# Patient Record
Sex: FEMALE | Race: WHITE | NOT HISPANIC OR LATINO | URBAN - METROPOLITAN AREA
[De-identification: names, ages, dates, MRNs, and addresses within clinical notes are randomized per-mention and may not be internally consistent; named-entity substitution may affect disease eponyms.]

---

## 2023-03-30 DIAGNOSIS — E78.5 HYPERLIPIDEMIA, UNSPECIFIED: ICD-10-CM

## 2023-03-30 RX ORDER — ATORVASTATIN CALCIUM 10 MG/1
TABLET, FILM COATED ORAL
Qty: 90 TABLET | Refills: 1 | Status: SHIPPED | OUTPATIENT
Start: 2023-03-30 | End: 2023-05-04

## 2023-04-11 ENCOUNTER — OFFICE VISIT (OUTPATIENT)
Dept: PRIMARY CARE | Facility: CLINIC | Age: 78
End: 2023-04-11
Payer: COMMERCIAL

## 2023-04-11 VITALS
OXYGEN SATURATION: 97 % | HEART RATE: 68 BPM | SYSTOLIC BLOOD PRESSURE: 140 MMHG | HEIGHT: 62 IN | DIASTOLIC BLOOD PRESSURE: 70 MMHG | BODY MASS INDEX: 26.85 KG/M2 | WEIGHT: 145.9 LBS

## 2023-04-11 DIAGNOSIS — E89.0 POSTABLATIVE HYPOTHYROIDISM: ICD-10-CM

## 2023-04-11 DIAGNOSIS — E55.9 VITAMIN D DEFICIENCY: ICD-10-CM

## 2023-04-11 DIAGNOSIS — E78.5 HYPERLIPIDEMIA, UNSPECIFIED HYPERLIPIDEMIA TYPE: ICD-10-CM

## 2023-04-11 DIAGNOSIS — E21.0 PRIMARY HYPERPARATHYROIDISM (MULTI): ICD-10-CM

## 2023-04-11 DIAGNOSIS — R03.0 ELEVATED BP WITHOUT DIAGNOSIS OF HYPERTENSION: Primary | ICD-10-CM

## 2023-04-11 PROBLEM — K21.9 ESOPHAGEAL REFLUX: Status: ACTIVE | Noted: 2023-04-11

## 2023-04-11 PROBLEM — H53.2 DIPLOPIA: Status: ACTIVE | Noted: 2023-04-11

## 2023-04-11 PROBLEM — D72.9 ABNORMAL WBC COUNT: Status: ACTIVE | Noted: 2023-04-11

## 2023-04-11 PROBLEM — J32.0 CHRONIC SINUSITIS OF BOTH MAXILLARY SINUSES: Status: ACTIVE | Noted: 2023-04-11

## 2023-04-11 PROBLEM — T14.8XXA MUSCLE STRAIN: Status: ACTIVE | Noted: 2023-04-11

## 2023-04-11 PROBLEM — M85.80 OSTEOPENIA: Status: ACTIVE | Noted: 2023-04-11

## 2023-04-11 PROBLEM — S83.90XA KNEE SPRAIN: Status: ACTIVE | Noted: 2023-04-11

## 2023-04-11 PROBLEM — M79.643 HAND PAIN: Status: ACTIVE | Noted: 2023-04-11

## 2023-04-11 PROBLEM — H61.23 IMPACTED CERUMEN OF BOTH EARS: Status: ACTIVE | Noted: 2023-04-11

## 2023-04-11 PROBLEM — H57.02 PHYSIOLOGIC ANISOCORIA: Status: ACTIVE | Noted: 2023-04-11

## 2023-04-11 PROBLEM — R09.82 POST-NASAL DRIP: Status: ACTIVE | Noted: 2023-04-11

## 2023-04-11 PROBLEM — E66.3 OVERWEIGHT WITH BODY MASS INDEX (BMI) OF 27 TO 27.9 IN ADULT: Status: RESOLVED | Noted: 2023-04-11 | Resolved: 2023-04-11

## 2023-04-11 PROBLEM — J30.9 ALLERGIC RHINITIS: Status: ACTIVE | Noted: 2023-04-11

## 2023-04-11 PROBLEM — K64.8 INTERNAL HEMORRHOIDS: Status: ACTIVE | Noted: 2023-04-11

## 2023-04-11 PROBLEM — M25.569 KNEE PAIN: Status: ACTIVE | Noted: 2023-04-11

## 2023-04-11 PROBLEM — E66.3 OVERWEIGHT WITH BODY MASS INDEX (BMI) OF 27 TO 27.9 IN ADULT: Status: ACTIVE | Noted: 2023-04-11

## 2023-04-11 PROBLEM — E07.9 THYROID EYE DISEASE: Status: ACTIVE | Noted: 2023-04-11

## 2023-04-11 PROBLEM — R79.9 ABNORMAL BLOOD CHEMISTRY: Status: ACTIVE | Noted: 2023-04-11

## 2023-04-11 PROBLEM — K59.09 CHRONIC CONSTIPATION: Status: ACTIVE | Noted: 2023-04-11

## 2023-04-11 PROBLEM — H61.23 IMPACTED CERUMEN OF BOTH EARS: Status: RESOLVED | Noted: 2023-04-11 | Resolved: 2023-04-11

## 2023-04-11 PROBLEM — R05.9 COUGH: Status: ACTIVE | Noted: 2023-04-11

## 2023-04-11 PROBLEM — H10.45 CHRONIC ALLERGIC CONJUNCTIVITIS: Status: ACTIVE | Noted: 2023-04-11

## 2023-04-11 PROBLEM — R92.1 BREAST CALCIFICATIONS: Status: ACTIVE | Noted: 2023-04-11

## 2023-04-11 PROBLEM — R73.01 IMPAIRED FASTING GLUCOSE: Status: RESOLVED | Noted: 2023-04-11 | Resolved: 2023-04-11

## 2023-04-11 PROBLEM — H50.10 CONSECUTIVE EXOTROPIA: Status: ACTIVE | Noted: 2023-04-11

## 2023-04-11 PROBLEM — M20.40 HAMMER TOE: Status: ACTIVE | Noted: 2023-04-11

## 2023-04-11 PROBLEM — K20.90 ESOPHAGITIS: Status: ACTIVE | Noted: 2023-04-11

## 2023-04-11 PROBLEM — H57.89 THYROID EYE DISEASE: Status: ACTIVE | Noted: 2023-04-11

## 2023-04-11 PROBLEM — H25.12 AGE-RELATED NUCLEAR CATARACT OF LEFT EYE: Status: ACTIVE | Noted: 2023-04-11

## 2023-04-11 PROBLEM — E34.9 ENDOCRINE EXOPHTHALMOS: Status: ACTIVE | Noted: 2023-04-11

## 2023-04-11 PROBLEM — R73.01 IMPAIRED FASTING GLUCOSE: Status: ACTIVE | Noted: 2023-04-11

## 2023-04-11 PROBLEM — E83.52 HYPERCALCEMIA: Status: ACTIVE | Noted: 2023-04-11

## 2023-04-11 PROBLEM — M20.10 HALLUX VALGUS: Status: ACTIVE | Noted: 2023-04-11

## 2023-04-11 PROBLEM — J32.0 CHRONIC SINUSITIS OF BOTH MAXILLARY SINUSES: Status: RESOLVED | Noted: 2023-04-11 | Resolved: 2023-04-11

## 2023-04-11 PROBLEM — H05.20 ENDOCRINE EXOPHTHALMOS: Status: ACTIVE | Noted: 2023-04-11

## 2023-04-11 PROBLEM — E03.9 HYPOTHYROIDISM: Status: ACTIVE | Noted: 2023-04-11

## 2023-04-11 PROBLEM — J32.4 CHRONIC PANSINUSITIS: Status: ACTIVE | Noted: 2023-04-11

## 2023-04-11 PROBLEM — E05.00 GRAVES DISEASE: Status: ACTIVE | Noted: 2023-04-11

## 2023-04-11 PROBLEM — H47.091: Status: ACTIVE | Noted: 2023-04-11

## 2023-04-11 PROCEDURE — 99213 OFFICE O/P EST LOW 20 MIN: CPT | Performed by: PHYSICIAN ASSISTANT

## 2023-04-11 PROCEDURE — 1159F MED LIST DOCD IN RCRD: CPT | Performed by: PHYSICIAN ASSISTANT

## 2023-04-11 PROCEDURE — 1036F TOBACCO NON-USER: CPT | Performed by: PHYSICIAN ASSISTANT

## 2023-04-11 PROCEDURE — 1160F RVW MEDS BY RX/DR IN RCRD: CPT | Performed by: PHYSICIAN ASSISTANT

## 2023-04-11 RX ORDER — ASPIRIN 81 MG/1
81 TABLET ORAL DAILY
COMMUNITY

## 2023-04-11 RX ORDER — VIT C/E/ZN/COPPR/LUTEIN/ZEAXAN 250MG-90MG
1 CAPSULE ORAL EVERY OTHER DAY
COMMUNITY

## 2023-04-11 RX ORDER — CHOLECALCIFEROL (VITAMIN D3) 125 MCG
CAPSULE ORAL
COMMUNITY
Start: 2021-05-17

## 2023-04-11 RX ORDER — LEVOTHYROXINE SODIUM 100 UG/1
100 TABLET ORAL DAILY
COMMUNITY
End: 2023-12-29

## 2023-04-11 RX ORDER — FLUTICASONE PROPIONATE 50 MCG
2 SPRAY, SUSPENSION (ML) NASAL DAILY
COMMUNITY

## 2023-04-11 ASSESSMENT — PATIENT HEALTH QUESTIONNAIRE - PHQ9
1. LITTLE INTEREST OR PLEASURE IN DOING THINGS: NOT AT ALL
SUM OF ALL RESPONSES TO PHQ9 QUESTIONS 1 AND 2: 0
2. FEELING DOWN, DEPRESSED OR HOPELESS: NOT AT ALL

## 2023-04-11 NOTE — PROGRESS NOTES
"Subjective   Caroline Bond is a 77 y.o. female who presents for Follow-up.  HPI 77-year-old female presenting for follow-up.  Overall doing very well.  No complaints.    HLD: Compliant with atorvastatin 10 mg, 81 mg ASA.  Lives a fairly active lifestyle.    Hypothyroidism, Grave's disease s/p ablation (2011): Compliant with Synthroid 100 mcg daily.  Denies any hot/cold intolerance, abnormal weight gain/loss, palpitations, constipation.  She follows with endocrinology, last seen 11/14/2022.  Follows with ophthalmologist, Dr. Wynn.    Primary hyperparathyroidism: Asymptomatic.  On vitamin D 5000 units daily.  Follows with endocrinology.    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:  per GYN, \"BIRADS 3 mammogram in November, recommended 6 month f/u (some likely benign calcifications seen in left breast)\"    Health maintenance:  Immunizations:  -Flu: deferred to fall 2023  -Pneumococcal: UTD  -Shingrix: UTD  -Tdap: Due, obtain from local pharmacy  Mammogram UTD (last 11/7/2022, needed 6-month follow-up which will be due in May)-Per GYN  Colon CA screening-not indicated due to age  DEXA UTD (last 1/4/2022)     Last MCR: 10/10/2022 (plain Medicare)    /70   Pulse 68   Ht 1.575 m (5' 2\")   Wt 66.2 kg (145 lb 14.4 oz)   SpO2 97%   BMI 26.69 kg/m²   Objective   Physical Exam  Vitals reviewed.   Constitutional:       General: She is not in acute distress.     Appearance: Normal appearance. She is not ill-appearing.   HENT:      Head: Normocephalic and atraumatic.   Eyes:      General: No scleral icterus.     Extraocular Movements: Extraocular movements intact.      Conjunctiva/sclera: Conjunctivae normal.      Pupils: Pupils are equal, round, and reactive to light.   Cardiovascular:      Rate and Rhythm: Normal rate and regular rhythm.      Heart sounds: Normal heart sounds. No murmur heard.     No friction rub. No gallop.   Pulmonary:      Effort: Pulmonary effort is normal. No respiratory " distress.      Breath sounds: Normal breath sounds. No stridor. No wheezing, rhonchi or rales.   Musculoskeletal:      Cervical back: Normal range of motion.      Right lower leg: No edema.      Left lower leg: No edema.   Skin:     General: Skin is warm and dry.   Neurological:      Mental Status: She is alert and oriented to person, place, and time. Mental status is at baseline.      Cranial Nerves: No cranial nerve deficit.      Gait: Gait normal.   Psychiatric:         Mood and Affect: Mood normal.         Behavior: Behavior normal.         Assessment/Plan   Problem List Items Addressed This Visit       Elevated BP without diagnosis of hypertension - Primary     BP slightly above goal but acceptable for age.  Continue managing through diet and exercise.  Follow strict DASH diet and exercise as tolerated.         Hyperlipidemia     Continue atorvastatin 10 mg and 81 mg ASA.  Lipid panel ordered.  Follow Mediterranean-style diet and regular exercise.         Relevant Orders    Comprehensive metabolic panel    Lipid panel    Hypothyroidism     Continue Synthroid 100 mcg daily.  TFTs ordered.  Follow with endocrinology.         Relevant Orders    Tsh With Reflex To Free T4 If Abnormal    Primary hyperparathyroidism (CMS/HCC)     Asymptomatic.  Continue vitamin D 5000 units daily with food.  Follow with endocrinology.          Other Visit Diagnoses       Vitamin D deficiency        Relevant Orders    Vitamin D 25-Hydroxy,Total

## 2023-04-11 NOTE — ASSESSMENT & PLAN NOTE
BP slightly above goal but acceptable for age.  Continue managing through diet and exercise.  Follow strict DASH diet and exercise as tolerated.

## 2023-04-11 NOTE — ASSESSMENT & PLAN NOTE
Continue atorvastatin 10 mg and 81 mg ASA.  Lipid panel ordered.  Follow Mediterranean-style diet and regular exercise.

## 2023-04-11 NOTE — ASSESSMENT & PLAN NOTE
S/p ablation 2011.  Continue Synthroid 100 mcg daily.  TFTs ordered.  Follow with endocrinology and ophthalmology.

## 2023-05-03 DIAGNOSIS — E78.5 HYPERLIPIDEMIA, UNSPECIFIED: ICD-10-CM

## 2023-05-04 RX ORDER — ATORVASTATIN CALCIUM 10 MG/1
TABLET, FILM COATED ORAL
Qty: 90 TABLET | Refills: 1 | Status: SHIPPED | OUTPATIENT
Start: 2023-05-04 | End: 2023-10-05

## 2023-07-18 ENCOUNTER — LAB (OUTPATIENT)
Dept: LAB | Facility: LAB | Age: 78
End: 2023-07-18
Payer: COMMERCIAL

## 2023-07-18 DIAGNOSIS — E78.5 HYPERLIPIDEMIA, UNSPECIFIED HYPERLIPIDEMIA TYPE: ICD-10-CM

## 2023-07-18 DIAGNOSIS — E89.0 POSTABLATIVE HYPOTHYROIDISM: ICD-10-CM

## 2023-07-18 DIAGNOSIS — E55.9 VITAMIN D DEFICIENCY: ICD-10-CM

## 2023-07-18 LAB
ALANINE AMINOTRANSFERASE (SGPT) (U/L) IN SER/PLAS: 17 U/L (ref 7–45)
ALBUMIN (G/DL) IN SER/PLAS: 4.3 G/DL (ref 3.4–5)
ALKALINE PHOSPHATASE (U/L) IN SER/PLAS: 74 U/L (ref 33–136)
ANION GAP IN SER/PLAS: 11 MMOL/L (ref 10–20)
ASPARTATE AMINOTRANSFERASE (SGOT) (U/L) IN SER/PLAS: 17 U/L (ref 9–39)
BILIRUBIN TOTAL (MG/DL) IN SER/PLAS: 0.5 MG/DL (ref 0–1.2)
CALCIDIOL (25 OH VITAMIN D3) (NG/ML) IN SER/PLAS: 62 NG/ML
CALCIUM (MG/DL) IN SER/PLAS: 10.7 MG/DL (ref 8.6–10.6)
CARBON DIOXIDE, TOTAL (MMOL/L) IN SER/PLAS: 29 MMOL/L (ref 21–32)
CHLORIDE (MMOL/L) IN SER/PLAS: 105 MMOL/L (ref 98–107)
CHOLESTEROL (MG/DL) IN SER/PLAS: 158 MG/DL (ref 0–199)
CHOLESTEROL IN HDL (MG/DL) IN SER/PLAS: 62.3 MG/DL
CHOLESTEROL/HDL RATIO: 2.5
CREATININE (MG/DL) IN SER/PLAS: 0.7 MG/DL (ref 0.5–1.05)
GFR FEMALE: 88 ML/MIN/1.73M2
GLUCOSE (MG/DL) IN SER/PLAS: 93 MG/DL (ref 74–99)
LDL: 82 MG/DL (ref 0–99)
POTASSIUM (MMOL/L) IN SER/PLAS: 4.5 MMOL/L (ref 3.5–5.3)
PROTEIN TOTAL: 6.7 G/DL (ref 6.4–8.2)
SODIUM (MMOL/L) IN SER/PLAS: 140 MMOL/L (ref 136–145)
THYROTROPIN (MIU/L) IN SER/PLAS BY DETECTION LIMIT <= 0.05 MIU/L: 1.23 MIU/L (ref 0.44–3.98)
TRIGLYCERIDE (MG/DL) IN SER/PLAS: 69 MG/DL (ref 0–149)
UREA NITROGEN (MG/DL) IN SER/PLAS: 12 MG/DL (ref 6–23)
VLDL: 14 MG/DL (ref 0–40)

## 2023-07-18 PROCEDURE — 84443 ASSAY THYROID STIM HORMONE: CPT

## 2023-07-18 PROCEDURE — 36415 COLL VENOUS BLD VENIPUNCTURE: CPT

## 2023-07-18 PROCEDURE — 80061 LIPID PANEL: CPT

## 2023-07-18 PROCEDURE — 82306 VITAMIN D 25 HYDROXY: CPT

## 2023-07-18 PROCEDURE — 80053 COMPREHEN METABOLIC PANEL: CPT

## 2023-07-21 NOTE — RESULT ENCOUNTER NOTE
Lab results are back.  All look good except:    Calcium level was slightly elevated above normal.  Cut back on calcium rich foods in the diet, take deep breaths, and increase water intake.  We will continue to monitor

## 2023-10-02 ENCOUNTER — APPOINTMENT (OUTPATIENT)
Dept: PRIMARY CARE | Facility: CLINIC | Age: 78
End: 2023-10-02
Payer: COMMERCIAL

## 2023-10-04 DIAGNOSIS — E78.5 HYPERLIPIDEMIA, UNSPECIFIED: ICD-10-CM

## 2023-10-05 RX ORDER — ATORVASTATIN CALCIUM 10 MG/1
TABLET, FILM COATED ORAL
Qty: 100 TABLET | Refills: 0 | Status: SHIPPED | OUTPATIENT
Start: 2023-10-05 | End: 2023-12-14

## 2023-10-13 ENCOUNTER — OFFICE VISIT (OUTPATIENT)
Dept: PRIMARY CARE | Facility: CLINIC | Age: 78
End: 2023-10-13
Payer: COMMERCIAL

## 2023-10-13 VITALS
HEIGHT: 63 IN | WEIGHT: 141.5 LBS | OXYGEN SATURATION: 94 % | DIASTOLIC BLOOD PRESSURE: 68 MMHG | BODY MASS INDEX: 25.07 KG/M2 | HEART RATE: 66 BPM | SYSTOLIC BLOOD PRESSURE: 111 MMHG

## 2023-10-13 DIAGNOSIS — J00 COMMON COLD: Primary | ICD-10-CM

## 2023-10-13 DIAGNOSIS — Z23 ENCOUNTER FOR IMMUNIZATION: ICD-10-CM

## 2023-10-13 PROCEDURE — 1036F TOBACCO NON-USER: CPT | Performed by: PHYSICIAN ASSISTANT

## 2023-10-13 PROCEDURE — G0008 ADMIN INFLUENZA VIRUS VAC: HCPCS | Performed by: PHYSICIAN ASSISTANT

## 2023-10-13 PROCEDURE — 1159F MED LIST DOCD IN RCRD: CPT | Performed by: PHYSICIAN ASSISTANT

## 2023-10-13 PROCEDURE — 90662 IIV NO PRSV INCREASED AG IM: CPT | Performed by: PHYSICIAN ASSISTANT

## 2023-10-13 PROCEDURE — 99213 OFFICE O/P EST LOW 20 MIN: CPT | Performed by: PHYSICIAN ASSISTANT

## 2023-10-13 PROCEDURE — 1126F AMNT PAIN NOTED NONE PRSNT: CPT | Performed by: PHYSICIAN ASSISTANT

## 2023-10-13 PROCEDURE — 1160F RVW MEDS BY RX/DR IN RCRD: CPT | Performed by: PHYSICIAN ASSISTANT

## 2023-10-13 ASSESSMENT — COLUMBIA-SUICIDE SEVERITY RATING SCALE - C-SSRS
2. HAVE YOU ACTUALLY HAD ANY THOUGHTS OF KILLING YOURSELF?: NO
6. HAVE YOU EVER DONE ANYTHING, STARTED TO DO ANYTHING, OR PREPARED TO DO ANYTHING TO END YOUR LIFE?: NO

## 2023-10-13 NOTE — PROGRESS NOTES
"Subjective   Caroline Bond is a 78 y.o. female who presents for Follow-up (Pt does not want to do the wellness visit; would like to discuss cold symptoms/).  HPI Caroline Bond is a 78 y.o. female presenting with c/o cold symptoms for the past month. She states she woke up on 9/17/23 with a sore throat, which lasted for 4 days. Had pain talking, eating, swallowing. Then developed fatigue, cough, sputum production, fatigue, sinus drainage (mixed color, mostly clear), post nasal drip, sneezing, itchy watery eyes. Denies any fever, chills, sick contcts.   Has tried mucinex-DM and ibuprofen. COVID test was negative.     12 point ROS reviewed and negative other than as stated in HPI    /68   Pulse 66   Ht 1.588 m (5' 2.5\")   Wt 64.2 kg (141 lb 8 oz)   SpO2 94%   BMI 25.47 kg/m²   Objective   Physical Exam  Vitals reviewed.   Constitutional:       General: She is not in acute distress.     Appearance: Normal appearance.   HENT:      Head: Normocephalic and atraumatic.      Right Ear: Tympanic membrane, ear canal and external ear normal. There is no impacted cerumen.      Left Ear: Tympanic membrane, ear canal and external ear normal. There is no impacted cerumen.      Nose: Nose normal. No congestion or rhinorrhea.      Mouth/Throat:      Mouth: Mucous membranes are moist.      Pharynx: Oropharynx is clear. No oropharyngeal exudate or posterior oropharyngeal erythema.   Eyes:      General: No scleral icterus.        Right eye: No discharge.         Left eye: No discharge.      Extraocular Movements: Extraocular movements intact.      Conjunctiva/sclera: Conjunctivae normal.      Pupils: Pupils are equal, round, and reactive to light.   Cardiovascular:      Rate and Rhythm: Normal rate and regular rhythm.      Heart sounds: Normal heart sounds. No murmur heard.     No friction rub. No gallop.   Pulmonary:      Effort: Pulmonary effort is normal. No respiratory distress.      Breath sounds: Normal breath " sounds. No stridor. No wheezing, rhonchi or rales.   Neurological:      Mental Status: She is alert.         Assessment/Plan   Problem List Items Addressed This Visit       Common cold - Primary     Nearly resolved. Take OTC antihistamine for itchy watery eyes, sneezing, post nasal drip. Take flonase for any sinus pressure, ear popping, and post nasal drip. Take mucinex for any sputum production. Call the office if no improvement of symptoms despite outlined treatment         Encounter for immunization    Relevant Orders    Flu vaccine, quadrivalent, high-dose, preservative free, age 65y+ (FLUZONE) (Completed)        Follow up in a few months for MCR wellness or sooner as needed

## 2023-10-15 PROBLEM — J00 COMMON COLD: Status: ACTIVE | Noted: 2023-10-15

## 2023-10-15 PROBLEM — Z23 ENCOUNTER FOR IMMUNIZATION: Status: ACTIVE | Noted: 2023-10-15

## 2023-10-16 NOTE — ASSESSMENT & PLAN NOTE
Nearly resolved. Take OTC antihistamine for itchy watery eyes, sneezing, post nasal drip. Take flonase for any sinus pressure, ear popping, and post nasal drip. Take mucinex for any sputum production. Call the office if no improvement of symptoms despite outlined treatment

## 2023-11-08 ENCOUNTER — ANCILLARY PROCEDURE (OUTPATIENT)
Dept: RADIOLOGY | Facility: CLINIC | Age: 78
End: 2023-11-08
Payer: COMMERCIAL

## 2023-11-08 DIAGNOSIS — R92.1 MAMMOGRAPHIC CALCIFICATION FOUND ON DIAGNOSTIC IMAGING OF BREAST: ICD-10-CM

## 2023-11-08 PROCEDURE — 77066 DX MAMMO INCL CAD BI: CPT | Performed by: RADIOLOGY

## 2023-11-08 PROCEDURE — 77062 BREAST TOMOSYNTHESIS BI: CPT

## 2023-11-08 PROCEDURE — G0279 TOMOSYNTHESIS, MAMMO: HCPCS | Performed by: RADIOLOGY

## 2023-11-14 ENCOUNTER — OFFICE VISIT (OUTPATIENT)
Dept: ENDOCRINOLOGY | Facility: CLINIC | Age: 78
End: 2023-11-14
Payer: COMMERCIAL

## 2023-11-14 VITALS — WEIGHT: 141 LBS | SYSTOLIC BLOOD PRESSURE: 108 MMHG | DIASTOLIC BLOOD PRESSURE: 70 MMHG | BODY MASS INDEX: 25.38 KG/M2

## 2023-11-14 DIAGNOSIS — E05.00 GRAVES DISEASE: ICD-10-CM

## 2023-11-14 DIAGNOSIS — E89.0 POSTABLATIVE HYPOTHYROIDISM: Primary | ICD-10-CM

## 2023-11-14 DIAGNOSIS — H57.89 THYROID EYE DISEASE: ICD-10-CM

## 2023-11-14 DIAGNOSIS — E07.9 THYROID EYE DISEASE: ICD-10-CM

## 2023-11-14 DIAGNOSIS — E21.0 PRIMARY HYPERPARATHYROIDISM (MULTI): ICD-10-CM

## 2023-11-14 PROCEDURE — 1159F MED LIST DOCD IN RCRD: CPT | Performed by: INTERNAL MEDICINE

## 2023-11-14 PROCEDURE — 99214 OFFICE O/P EST MOD 30 MIN: CPT | Performed by: INTERNAL MEDICINE

## 2023-11-14 PROCEDURE — 1126F AMNT PAIN NOTED NONE PRSNT: CPT | Performed by: INTERNAL MEDICINE

## 2023-11-14 PROCEDURE — 1160F RVW MEDS BY RX/DR IN RCRD: CPT | Performed by: INTERNAL MEDICINE

## 2023-11-14 PROCEDURE — 1036F TOBACCO NON-USER: CPT | Performed by: INTERNAL MEDICINE

## 2023-11-14 NOTE — PROGRESS NOTES
Patient ID: Caroline Bond is a 78 y.o. female who presents for Follow-up.  HPI  The patient comes in for follow up.    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    Her eyes have remained stable although she does have some double vision looking to the left.    She continues on Synthroid 100 mcg/day.    She continues to have no symptoms of hyper or hypothyroidism or hypercalcemia.    Physically she has no complaints.    ROS  Comprehensive review of systems is negative.    Objective   Physical Exam  Visit Vitals  /70      Vitals:    11/14/23 1029   Weight: 64 kg (141 lb)      Body mass index is 25.38 kg/m².      Eyes mild proptosis right greater than left no injection  ENT normal. No adenopathy  Thyroid impalpable. No nodules  Chest clear to auscultation  Heart sounds are normal  Abdomen nontender. Bowel sounds normal. No organomegaly  Feet are okay    Assessment/Plan     1.  Graves' disease post ablation on replacement  2.  Ophthalmopathy  3.  Primary hyperparathyroidism  4.  Hyperlipidemia  5.  Impaired fasting glucose    We reviewed her blood work from July.    She will continue her current regimen.    Again encouraged her to watch her carbohydrate intake.    We discussed the eyes and if they become more of an issue following up with Dr. Wynn whom she has seen in the past.    She will follow-up with me in 1 year sooner as needed.

## 2023-12-14 ENCOUNTER — OFFICE VISIT (OUTPATIENT)
Dept: OBSTETRICS AND GYNECOLOGY | Facility: CLINIC | Age: 78
End: 2023-12-14
Payer: COMMERCIAL

## 2023-12-14 VITALS
DIASTOLIC BLOOD PRESSURE: 78 MMHG | BODY MASS INDEX: 25.34 KG/M2 | HEIGHT: 63 IN | WEIGHT: 143 LBS | SYSTOLIC BLOOD PRESSURE: 122 MMHG

## 2023-12-14 DIAGNOSIS — E78.5 HYPERLIPIDEMIA, UNSPECIFIED: ICD-10-CM

## 2023-12-14 DIAGNOSIS — Z12.39 ENCOUNTER FOR SCREENING BREAST EXAMINATION: Primary | ICD-10-CM

## 2023-12-14 PROBLEM — E03.9 HYPOTHYROIDISM: Status: ACTIVE | Noted: 2019-07-03

## 2023-12-14 PROBLEM — K21.9 GERD (GASTROESOPHAGEAL REFLUX DISEASE): Status: ACTIVE | Noted: 2019-07-03

## 2023-12-14 PROCEDURE — 1159F MED LIST DOCD IN RCRD: CPT | Performed by: OBSTETRICS & GYNECOLOGY

## 2023-12-14 PROCEDURE — 1160F RVW MEDS BY RX/DR IN RCRD: CPT | Performed by: OBSTETRICS & GYNECOLOGY

## 2023-12-14 PROCEDURE — 99213 OFFICE O/P EST LOW 20 MIN: CPT | Performed by: OBSTETRICS & GYNECOLOGY

## 2023-12-14 PROCEDURE — 1036F TOBACCO NON-USER: CPT | Performed by: OBSTETRICS & GYNECOLOGY

## 2023-12-14 PROCEDURE — 1126F AMNT PAIN NOTED NONE PRSNT: CPT | Performed by: OBSTETRICS & GYNECOLOGY

## 2023-12-14 RX ORDER — ATORVASTATIN CALCIUM 10 MG/1
TABLET, FILM COATED ORAL
Qty: 100 TABLET | Refills: 1 | Status: SHIPPED | OUTPATIENT
Start: 2023-12-14

## 2023-12-14 ASSESSMENT — ENCOUNTER SYMPTOMS
CONSTITUTIONAL NEGATIVE: 0
ALLERGIC/IMMUNOLOGIC NEGATIVE: 0
GASTROINTESTINAL NEGATIVE: 0
ENDOCRINE NEGATIVE: 0
MUSCULOSKELETAL NEGATIVE: 0
CARDIOVASCULAR NEGATIVE: 0
PSYCHIATRIC NEGATIVE: 0
RESPIRATORY NEGATIVE: 0
NEUROLOGICAL NEGATIVE: 0
HEMATOLOGIC/LYMPHATIC NEGATIVE: 0
EYES NEGATIVE: 0

## 2023-12-14 NOTE — PROGRESS NOTES
"Caroline Bond is a 78 y.o. here for breast exam    HPI: Pt is here her every 6 month breast exam.  Her next trip is a cruise to [facility] [place].    Objective   /78   Ht 1.588 m (5' 2.5\")   Wt 64.9 kg (143 lb)   LMP  (LMP Unknown)   BMI 25.74 kg/m²      General:   Alert and oriented, in no acute distress   Neck:    Breast/Axilla: Normal to palpation bilaterally without masses, skin changes, or nipple discharge.    Abdomen:    Vulva:    Vagina:    Cervix:    Uterus:    Adnexa:    Pelvic Floor    Psych Normal affect. Normal mood.      Assessment and Plan:  Breast exam normal  Problem List Items Addressed This Visit    None  Visit Diagnoses       Encounter for screening breast examination    -  Primary           No orders of the defined types were placed in this encounter.      "

## 2023-12-28 DIAGNOSIS — E05.00 GRAVES DISEASE: Primary | ICD-10-CM

## 2023-12-29 RX ORDER — LEVOTHYROXINE SODIUM 100 UG/1
100 TABLET ORAL DAILY
Qty: 100 TABLET | Refills: 2 | Status: SHIPPED | OUTPATIENT
Start: 2023-12-29

## 2024-02-09 ENCOUNTER — TELEPHONE (OUTPATIENT)
Dept: OBSTETRICS AND GYNECOLOGY | Facility: CLINIC | Age: 79
End: 2024-02-09
Payer: COMMERCIAL

## 2024-02-09 DIAGNOSIS — Z12.31 BREAST CANCER SCREENING BY MAMMOGRAM: ICD-10-CM

## 2024-02-09 NOTE — TELEPHONE ENCOUNTER
Called pt, no answer, left voicemail for return call  Re: order in for mamm nuber left to schedule [phone]

## 2024-04-05 ENCOUNTER — OFFICE VISIT (OUTPATIENT)
Dept: PRIMARY CARE | Facility: CLINIC | Age: 79
End: 2024-04-05
Payer: COMMERCIAL

## 2024-04-05 ENCOUNTER — TELEPHONE (OUTPATIENT)
Dept: PRIMARY CARE | Facility: CLINIC | Age: 79
End: 2024-04-05

## 2024-04-05 VITALS
HEIGHT: 62 IN | RESPIRATION RATE: 16 BRPM | OXYGEN SATURATION: 97 % | WEIGHT: 138 LBS | SYSTOLIC BLOOD PRESSURE: 145 MMHG | BODY MASS INDEX: 25.4 KG/M2 | TEMPERATURE: 98 F | DIASTOLIC BLOOD PRESSURE: 83 MMHG | HEART RATE: 68 BPM

## 2024-04-05 DIAGNOSIS — R79.9 ABNORMAL FINDING OF BLOOD CHEMISTRY, UNSPECIFIED: ICD-10-CM

## 2024-04-05 DIAGNOSIS — E55.9 VITAMIN D DEFICIENCY: ICD-10-CM

## 2024-04-05 DIAGNOSIS — E89.0 POSTABLATIVE HYPOTHYROIDISM: ICD-10-CM

## 2024-04-05 DIAGNOSIS — Z78.0 MENOPAUSE: ICD-10-CM

## 2024-04-05 DIAGNOSIS — Z00.00 HEALTH CARE MAINTENANCE: ICD-10-CM

## 2024-04-05 DIAGNOSIS — E78.5 HYPERLIPIDEMIA, UNSPECIFIED HYPERLIPIDEMIA TYPE: Primary | ICD-10-CM

## 2024-04-05 PROCEDURE — 1157F ADVNC CARE PLAN IN RCRD: CPT | Performed by: STUDENT IN AN ORGANIZED HEALTH CARE EDUCATION/TRAINING PROGRAM

## 2024-04-05 PROCEDURE — 99214 OFFICE O/P EST MOD 30 MIN: CPT | Performed by: STUDENT IN AN ORGANIZED HEALTH CARE EDUCATION/TRAINING PROGRAM

## 2024-04-05 NOTE — PROGRESS NOTES
Subjective   Patient ID: Caroline Bond is a 78 y.o. female who presents for Establish Care.  HPI    Caroline Bond is 78 y.o. is here to establish care  Medical history and medications are significant for     Hyperlipidemia on atorvastatin 10 mg   Hypothyroidism  - synthroid   Low dose adspurin   Reports is abnormal mammogram in the past however advised to get mammogram 6 months    NO concerns/ Main concerns today:             Past Medical History:   Diagnosis Date    Benign neoplasm of unspecified breast 08/31/2017    Breast fibroadenoma    Encounter for screening mammogram for malignant neoplasm of breast     Visit for screening mammogram    Hyperlipidemia, unspecified 07/11/2014    Hyperlipidemia    Hypothyroidism, unspecified 07/11/2014    Hypothyroidism    Other chronic allergic conjunctivitis 01/06/2016    Chronic allergic conjunctivitis    Other conditions influencing health status     DEXA Body Composition Study    Other disorders of optic nerve, not elsewhere classified, right eye 06/30/2014    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    Personal history of malignant neoplasm, unspecified     History of malignant neoplasm    Personal history of other diseases of the digestive system 12/05/2014    History of oral aphthous ulcers    Personal history of other diseases of the musculoskeletal system and connective tissue     History of osteopenia    Personal history of other endocrine, nutritional and metabolic disease     History of hyperparathyroidism    Personal history of other specified conditions     History of edema    Primary hyperparathyroidism (CMS/HCC) 07/11/2014    Primary hyperparathyroidism    Thyrotoxicosis with diffuse goiter without thyrotoxic crisis or storm     Graves disease    Unspecified benign mammary dysplasia of unspecified breast 12/19/2019    Atypical ductal hyperplasia of breast    Unspecified benign mammary dysplasia of unspecified breast     Atypical lobular hyperplasia of  "breast    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      Past Surgical History:   Procedure Laterality Date    BREAST BIOPSY Right 09/16/2013    Biopsy Breast Open    CHOLECYSTECTOMY  09/16/2013    Cholecystectomy Laparoscopic    COLONOSCOPY  06/26/2013    Complete Colonoscopy    HYSTERECTOMY  09/16/2013    Hysterectomy    OTHER SURGICAL HISTORY  09/29/2022    Sinus surgery    OTHER SURGICAL HISTORY  03/30/2018    Staging Laparotomy For Hodgkin's Disease Or Lymphoma    OTHER SURGICAL HISTORY  10/08/2019    Appendectomy    OTHER SURGICAL HISTORY  10/08/2019    Cataract surgery      Family History   Problem Relation Name Age of Onset    Heart failure Mother      Dementia Mother      Hypertension Mother      Macular degeneration Mother      Dementia Father      Parkinsonism Father      Basal cell carcinoma Sister      Cancer Sister      Thyroid disease Sister      Hyperlipidemia Sister        Allergies   Allergen Reactions    Codeine Unknown    Ephedrine Unknown     heart palpitations  throat swelling    Moxifloxacin Unknown     neuropathy    Penicillins Unknown          Occupation:     Review of Systems   Constitutional:  Negative for activity change and fever.   HENT:  Negative for congestion.    Respiratory:  Negative for cough, shortness of breath and wheezing.    Cardiovascular:  Negative for chest pain and leg swelling.   Gastrointestinal:  Negative for abdominal pain, constipation, nausea and vomiting.   Endocrine: Negative for cold intolerance.   Genitourinary:  Negative for dysuria, hematuria and urgency.   Neurological:  Negative for dizziness, speech difficulty, weakness and numbness.   Psychiatric/Behavioral:  Negative for self-injury and suicidal ideas.        Objective   Visit Vitals  /83   Pulse 68   Temp 36.7 °C (98 °F)   Resp 16   Ht 1.575 m (5' 2\")   Wt 62.6 kg (138 lb)   LMP  (LMP Unknown)   SpO2 97%   BMI 25.24 kg/m²   OB Status Postmenopausal   Smoking Status Never   BSA 1.65 m²    "   Physical Exam  Constitutional:       Appearance: Normal appearance.   HENT:      Head: Normocephalic and atraumatic.      Nose: Nose normal.      Mouth/Throat:      Mouth: Mucous membranes are moist.   Eyes:      Conjunctiva/sclera: Conjunctivae normal.      Pupils: Pupils are equal, round, and reactive to light.   Cardiovascular:      Rate and Rhythm: Normal rate and regular rhythm.      Pulses: Normal pulses.      Heart sounds: Normal heart sounds.   Pulmonary:      Effort: Pulmonary effort is normal.      Breath sounds: Normal breath sounds.   Musculoskeletal:         General: Normal range of motion.      Cervical back: Neck supple.   Skin:     General: Skin is warm.   Neurological:      General: No focal deficit present.      Mental Status: She is alert and oriented to person, place, and time.   Psychiatric:         Mood and Affect: Mood normal.         Behavior: Behavior normal.         Thought Content: Thought content normal.         Judgment: Judgment normal.         Assessment/Plan   Diagnoses and all orders for this visit:  Hyperlipidemia, unspecified hyperlipidemia type  Repeat lipid panel.  Lifestyle modifications.  Continue atorvastatin 10 mg  -     Lipid Panel; Future  Postablative hypothyroidism  Repeat TSH.  Continue Synthroid-     TSH; Future  Health care maintenance  -     CBC; Future  -     Comprehensive Metabolic Panel; Future  -     Vitamin D 25 hydroxy; Future  -     XR DEXA bone density; Future  Vitamin D deficiency  -     Vitamin D 25 hydroxy; Future  Abnormal finding of blood chemistry, unspecified  -     CBC; Future  Menopause  -     XR DEXA bone density; Future

## 2024-04-29 ASSESSMENT — ENCOUNTER SYMPTOMS
DYSURIA: 0
VOMITING: 0
NAUSEA: 0
WEAKNESS: 0
ABDOMINAL PAIN: 0
NUMBNESS: 0
SHORTNESS OF BREATH: 0
WHEEZING: 0
FEVER: 0
DIZZINESS: 0
HEMATURIA: 0
COUGH: 0
CONSTIPATION: 0
ACTIVITY CHANGE: 0
SPEECH DIFFICULTY: 0

## 2024-05-10 ENCOUNTER — HOSPITAL ENCOUNTER (OUTPATIENT)
Dept: RADIOLOGY | Facility: CLINIC | Age: 79
Discharge: HOME | End: 2024-05-10
Payer: COMMERCIAL

## 2024-05-10 DIAGNOSIS — Z12.31 BREAST CANCER SCREENING BY MAMMOGRAM: ICD-10-CM

## 2024-05-16 ENCOUNTER — APPOINTMENT (OUTPATIENT)
Dept: PRIMARY CARE | Facility: CLINIC | Age: 79
End: 2024-05-16
Payer: COMMERCIAL

## 2024-06-13 ENCOUNTER — APPOINTMENT (OUTPATIENT)
Dept: OBSTETRICS AND GYNECOLOGY | Facility: CLINIC | Age: 79
End: 2024-06-13
Payer: COMMERCIAL

## 2024-06-20 ENCOUNTER — APPOINTMENT (OUTPATIENT)
Dept: OBSTETRICS AND GYNECOLOGY | Facility: CLINIC | Age: 79
End: 2024-06-20
Payer: COMMERCIAL

## 2024-06-27 ENCOUNTER — APPOINTMENT (OUTPATIENT)
Dept: OBSTETRICS AND GYNECOLOGY | Facility: CLINIC | Age: 79
End: 2024-06-27
Payer: COMMERCIAL

## 2024-07-01 ENCOUNTER — LAB (OUTPATIENT)
Dept: LAB | Facility: LAB | Age: 79
End: 2024-07-01
Payer: COMMERCIAL

## 2024-07-01 DIAGNOSIS — E55.9 VITAMIN D DEFICIENCY: ICD-10-CM

## 2024-07-01 DIAGNOSIS — R79.9 ABNORMAL FINDING OF BLOOD CHEMISTRY, UNSPECIFIED: ICD-10-CM

## 2024-07-01 DIAGNOSIS — Z00.00 HEALTH CARE MAINTENANCE: ICD-10-CM

## 2024-07-01 DIAGNOSIS — E89.0 POSTABLATIVE HYPOTHYROIDISM: ICD-10-CM

## 2024-07-01 DIAGNOSIS — E78.5 HYPERLIPIDEMIA, UNSPECIFIED HYPERLIPIDEMIA TYPE: ICD-10-CM

## 2024-07-01 LAB
25(OH)D3 SERPL-MCNC: 67 NG/ML (ref 30–100)
ALBUMIN SERPL BCP-MCNC: 4.4 G/DL (ref 3.4–5)
ALP SERPL-CCNC: 64 U/L (ref 33–136)
ALT SERPL W P-5'-P-CCNC: 14 U/L (ref 7–45)
ANION GAP SERPL CALC-SCNC: 12 MMOL/L (ref 10–20)
AST SERPL W P-5'-P-CCNC: 14 U/L (ref 9–39)
BILIRUB SERPL-MCNC: 0.7 MG/DL (ref 0–1.2)
BUN SERPL-MCNC: 17 MG/DL (ref 6–23)
CALCIUM SERPL-MCNC: 10.9 MG/DL (ref 8.6–10.6)
CHLORIDE SERPL-SCNC: 103 MMOL/L (ref 98–107)
CHOLEST SERPL-MCNC: 180 MG/DL (ref 0–199)
CHOLESTEROL/HDL RATIO: 2.6
CO2 SERPL-SCNC: 29 MMOL/L (ref 21–32)
CREAT SERPL-MCNC: 0.51 MG/DL (ref 0.5–1.05)
EGFRCR SERPLBLD CKD-EPI 2021: >90 ML/MIN/1.73M*2
ERYTHROCYTE [DISTWIDTH] IN BLOOD BY AUTOMATED COUNT: 13.2 % (ref 11.5–14.5)
GLUCOSE SERPL-MCNC: 103 MG/DL (ref 74–99)
HCT VFR BLD AUTO: 40 % (ref 36–46)
HDLC SERPL-MCNC: 69.9 MG/DL
HGB BLD-MCNC: 13 G/DL (ref 12–16)
LDLC SERPL CALC-MCNC: 95 MG/DL
MCH RBC QN AUTO: 29.8 PG (ref 26–34)
MCHC RBC AUTO-ENTMCNC: 32.5 G/DL (ref 32–36)
MCV RBC AUTO: 92 FL (ref 80–100)
NON HDL CHOLESTEROL: 110 MG/DL (ref 0–149)
NRBC BLD-RTO: 0 /100 WBCS (ref 0–0)
PLATELET # BLD AUTO: 186 X10*3/UL (ref 150–450)
POTASSIUM SERPL-SCNC: 4.5 MMOL/L (ref 3.5–5.3)
PROT SERPL-MCNC: 7 G/DL (ref 6.4–8.2)
RBC # BLD AUTO: 4.36 X10*6/UL (ref 4–5.2)
SODIUM SERPL-SCNC: 139 MMOL/L (ref 136–145)
TRIGL SERPL-MCNC: 74 MG/DL (ref 0–149)
TSH SERPL-ACNC: 0.38 MIU/L (ref 0.44–3.98)
VLDL: 15 MG/DL (ref 0–40)
WBC # BLD AUTO: 5.3 X10*3/UL (ref 4.4–11.3)

## 2024-07-01 PROCEDURE — 84443 ASSAY THYROID STIM HORMONE: CPT

## 2024-07-01 PROCEDURE — 82306 VITAMIN D 25 HYDROXY: CPT

## 2024-07-01 PROCEDURE — 80053 COMPREHEN METABOLIC PANEL: CPT

## 2024-07-01 PROCEDURE — 80061 LIPID PANEL: CPT

## 2024-07-01 PROCEDURE — 36415 COLL VENOUS BLD VENIPUNCTURE: CPT

## 2024-07-01 PROCEDURE — 85027 COMPLETE CBC AUTOMATED: CPT

## 2024-07-08 ENCOUNTER — HOSPITAL ENCOUNTER (OUTPATIENT)
Dept: RADIOLOGY | Facility: CLINIC | Age: 79
Discharge: HOME | End: 2024-07-08
Payer: COMMERCIAL

## 2024-07-08 DIAGNOSIS — Z00.00 HEALTH CARE MAINTENANCE: ICD-10-CM

## 2024-07-08 DIAGNOSIS — Z78.0 MENOPAUSE: ICD-10-CM

## 2024-07-08 PROCEDURE — 77080 DXA BONE DENSITY AXIAL: CPT

## 2024-08-06 ENCOUNTER — TELEPHONE (OUTPATIENT)
Dept: PRIMARY CARE | Facility: CLINIC | Age: 79
End: 2024-08-06
Payer: COMMERCIAL

## 2024-08-06 ENCOUNTER — TELEPHONE (OUTPATIENT)
Dept: PRIMARY CARE | Facility: CLINIC | Age: 79
End: 2024-08-06

## 2024-08-06 NOTE — TELEPHONE ENCOUNTER
Had a bone density, her doctor recommended calcium, she said you didn't want her to take, please advise.

## 2024-09-10 DIAGNOSIS — E05.00 GRAVES DISEASE: ICD-10-CM

## 2024-09-11 RX ORDER — LEVOTHYROXINE SODIUM 100 UG/1
100 TABLET ORAL DAILY
Qty: 100 TABLET | Refills: 2 | Status: SHIPPED | OUTPATIENT
Start: 2024-09-11

## 2024-11-07 ENCOUNTER — APPOINTMENT (OUTPATIENT)
Dept: PRIMARY CARE | Facility: CLINIC | Age: 79
End: 2024-11-07
Payer: COMMERCIAL

## 2024-11-07 VITALS
OXYGEN SATURATION: 97 % | DIASTOLIC BLOOD PRESSURE: 82 MMHG | SYSTOLIC BLOOD PRESSURE: 143 MMHG | HEIGHT: 62 IN | WEIGHT: 136.6 LBS | BODY MASS INDEX: 25.14 KG/M2 | HEART RATE: 65 BPM

## 2024-11-07 DIAGNOSIS — Z00.00 ROUTINE GENERAL MEDICAL EXAMINATION AT HEALTH CARE FACILITY: Primary | ICD-10-CM

## 2024-11-07 DIAGNOSIS — E78.5 HYPERLIPIDEMIA, UNSPECIFIED: ICD-10-CM

## 2024-11-07 DIAGNOSIS — Z00.00 HEALTH CARE MAINTENANCE: ICD-10-CM

## 2024-11-07 DIAGNOSIS — M85.80 OSTEOPENIA, UNSPECIFIED LOCATION: ICD-10-CM

## 2024-11-07 PROCEDURE — 99214 OFFICE O/P EST MOD 30 MIN: CPT | Performed by: STUDENT IN AN ORGANIZED HEALTH CARE EDUCATION/TRAINING PROGRAM

## 2024-11-07 PROCEDURE — 1158F ADVNC CARE PLAN TLK DOCD: CPT | Performed by: STUDENT IN AN ORGANIZED HEALTH CARE EDUCATION/TRAINING PROGRAM

## 2024-11-07 PROCEDURE — 90677 PCV20 VACCINE IM: CPT | Performed by: STUDENT IN AN ORGANIZED HEALTH CARE EDUCATION/TRAINING PROGRAM

## 2024-11-07 PROCEDURE — G0008 ADMIN INFLUENZA VIRUS VAC: HCPCS | Performed by: STUDENT IN AN ORGANIZED HEALTH CARE EDUCATION/TRAINING PROGRAM

## 2024-11-07 PROCEDURE — G0009 ADMIN PNEUMOCOCCAL VACCINE: HCPCS | Performed by: STUDENT IN AN ORGANIZED HEALTH CARE EDUCATION/TRAINING PROGRAM

## 2024-11-07 PROCEDURE — 93000 ELECTROCARDIOGRAM COMPLETE: CPT | Performed by: STUDENT IN AN ORGANIZED HEALTH CARE EDUCATION/TRAINING PROGRAM

## 2024-11-07 PROCEDURE — 1170F FXNL STATUS ASSESSED: CPT | Performed by: STUDENT IN AN ORGANIZED HEALTH CARE EDUCATION/TRAINING PROGRAM

## 2024-11-07 PROCEDURE — G0439 PPPS, SUBSEQ VISIT: HCPCS | Performed by: STUDENT IN AN ORGANIZED HEALTH CARE EDUCATION/TRAINING PROGRAM

## 2024-11-07 PROCEDURE — 1157F ADVNC CARE PLAN IN RCRD: CPT | Performed by: STUDENT IN AN ORGANIZED HEALTH CARE EDUCATION/TRAINING PROGRAM

## 2024-11-07 PROCEDURE — 90662 IIV NO PRSV INCREASED AG IM: CPT | Performed by: STUDENT IN AN ORGANIZED HEALTH CARE EDUCATION/TRAINING PROGRAM

## 2024-11-07 PROCEDURE — 1123F ACP DISCUSS/DSCN MKR DOCD: CPT | Performed by: STUDENT IN AN ORGANIZED HEALTH CARE EDUCATION/TRAINING PROGRAM

## 2024-11-07 PROCEDURE — 99397 PER PM REEVAL EST PAT 65+ YR: CPT | Performed by: STUDENT IN AN ORGANIZED HEALTH CARE EDUCATION/TRAINING PROGRAM

## 2024-11-07 RX ORDER — ATORVASTATIN CALCIUM 10 MG/1
10 TABLET, FILM COATED ORAL NIGHTLY
Qty: 100 TABLET | Refills: 1 | Status: SHIPPED | OUTPATIENT
Start: 2024-11-07

## 2024-11-07 ASSESSMENT — ACTIVITIES OF DAILY LIVING (ADL)
GROCERY_SHOPPING: INDEPENDENT
TAKING_MEDICATION: INDEPENDENT
DRESSING: INDEPENDENT
DOING_HOUSEWORK: INDEPENDENT
MANAGING_FINANCES: INDEPENDENT
BATHING: INDEPENDENT

## 2024-11-07 ASSESSMENT — PATIENT HEALTH QUESTIONNAIRE - PHQ9
2. FEELING DOWN, DEPRESSED OR HOPELESS: NOT AT ALL
1. LITTLE INTEREST OR PLEASURE IN DOING THINGS: NOT AT ALL
SUM OF ALL RESPONSES TO PHQ9 QUESTIONS 1 AND 2: 0

## 2024-11-07 ASSESSMENT — ENCOUNTER SYMPTOMS
LOSS OF SENSATION IN FEET: 1
OCCASIONAL FEELINGS OF UNSTEADINESS: 0
DEPRESSION: 0

## 2024-11-07 NOTE — PROGRESS NOTES
Subjective   Patient ID: Caroline Bond is a 79 y.o. female who presents for Medicare Annual Wellness Visit Subsequent.  HPI  Caroline Bond is 78 y.o. is here to establish care  Medical history and medications are significant for      Hyperlipidemia on atorvastatin 10 mg   Hypothyroidism  - synthroid - following with endo   Low dose aspirin  Hyperparathyroid following with [facility]  Reports is abnormal mammogram in the past however advised to get mammogram 6 months      # HM  Abnormal MMG- repeat scheduled soon   Colonoscopy  Bone density  osteopenia with FRAX- 24 ; on vit D and calcium supplements   Prev on fosamax 2009 to 2017   Heart burn   femur neck  -1.0 to -1.5  Frax score 24   Advise to consult endo- seeing next week     UTD shingles  Prevnar 13 and PPSV 23 - ? Prevnar   Tdap  UTD        Plan today  EKG   Flu vaccine   BP         Past Medical History:   Diagnosis Date    Atypical ductal hyperplasia, breast     Benign neoplasm of unspecified breast 08/31/2017    Breast fibroadenoma    Encounter for screening mammogram for malignant neoplasm of breast     Visit for screening mammogram    Hyperlipidemia, unspecified 07/11/2014    Hyperlipidemia    Hypothyroidism, unspecified 07/11/2014    Hypothyroidism    Other chronic allergic conjunctivitis 01/06/2016    Chronic allergic conjunctivitis    Other conditions influencing health status     DEXA Body Composition Study    Other disorders of optic nerve, not elsewhere classified, right eye 06/30/2014    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    Personal history of malignant neoplasm, unspecified     History of malignant neoplasm    Personal history of other diseases of the digestive system 12/05/2014    History of oral aphthous ulcers    Personal history of other diseases of the musculoskeletal system and connective tissue     History of osteopenia    Personal history of other endocrine, nutritional and metabolic disease     History of hyperparathyroidism     Personal history of other specified conditions     History of edema    Primary hyperparathyroidism (Multi) 07/11/2014    Primary hyperparathyroidism    Thyrotoxicosis with diffuse goiter without thyrotoxic crisis or storm     Graves disease    Unspecified benign mammary dysplasia of unspecified breast 12/19/2019    Atypical ductal hyperplasia of breast    Unspecified benign mammary dysplasia of unspecified breast     Atypical lobular hyperplasia of breast    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      Past Surgical History:   Procedure Laterality Date    BREAST BIOPSY Right 11/25/2009    Biopsy Breast Open    CHOLECYSTECTOMY  09/16/2013    Cholecystectomy Laparoscopic    COLONOSCOPY  06/26/2013    Complete Colonoscopy    HYSTERECTOMY  09/16/2013    Hysterectomy    OTHER SURGICAL HISTORY  09/29/2022    Sinus surgery    OTHER SURGICAL HISTORY  03/30/2018    Staging Laparotomy For Hodgkin's Disease Or Lymphoma    OTHER SURGICAL HISTORY  10/08/2019    Appendectomy    OTHER SURGICAL HISTORY  10/08/2019    Cataract surgery      Family History   Problem Relation Name Age of Onset    Heart failure Mother      Dementia Mother      Hypertension Mother      Macular degeneration Mother      Dementia Father      Parkinsonism Father      Basal cell carcinoma Sister      Cancer Sister      Thyroid disease Sister      Hyperlipidemia Sister      Breast cancer Paternal Cousin      Breast cancer Paternal Cousin        Allergies   Allergen Reactions    Codeine Unknown    Ephedrine Unknown     heart palpitations  throat swelling    Moxifloxacin Unknown     neuropathy    Penicillins Unknown          Occupation:     Review of Systems   Constitutional:  Negative for activity change and fever.   HENT:  Negative for congestion.    Respiratory:  Negative for cough, shortness of breath and wheezing.    Cardiovascular:  Negative for chest pain and leg swelling.   Gastrointestinal:  Negative for abdominal pain, constipation, nausea  "and vomiting.   Endocrine: Negative for cold intolerance.   Genitourinary:  Negative for dysuria, hematuria and urgency.   Neurological:  Negative for dizziness, speech difficulty, weakness and numbness.   Psychiatric/Behavioral:  Negative for self-injury and suicidal ideas.        Objective   Visit Vitals  /82 (BP Location: Right arm, Patient Position: Sitting)   Pulse 65   Ht 1.575 m (5' 2\")   Wt 62 kg (136 lb 9.6 oz)   LMP  (LMP Unknown)   SpO2 97%   BMI 24.98 kg/m²   OB Status Postmenopausal   Smoking Status Never   BSA 1.65 m²      Physical Exam  HENT:      Head: Normocephalic and atraumatic.      Right Ear: Tympanic membrane normal.      Left Ear: Tympanic membrane normal.      Nose: Nose normal.      Mouth/Throat:      Mouth: Mucous membranes are moist.   Eyes:      Extraocular Movements: Extraocular movements intact.      Conjunctiva/sclera: Conjunctivae normal.      Pupils: Pupils are equal, round, and reactive to light.   Cardiovascular:      Rate and Rhythm: Normal rate and regular rhythm.      Pulses: Normal pulses.      Heart sounds: Normal heart sounds.   Pulmonary:      Effort: Pulmonary effort is normal.      Breath sounds: Normal breath sounds. No stridor. No rhonchi.   Abdominal:      General: Bowel sounds are normal.      Palpations: Abdomen is soft.      Tenderness: There is no abdominal tenderness. There is no guarding or rebound.   Musculoskeletal:      Cervical back: Neck supple.   Neurological:      Mental Status: She is oriented to person, place, and time.   Psychiatric:         Mood and Affect: Mood normal.         Behavior: Behavior normal.         Assessment/Plan   Diagnoses and all orders for this visit:  Routine general medical examination at health care facility  -     1 Year Follow Up In [facility] - PCP - Wellness Exam; Future  Hyperlipidemia, unspecified  -     atorvastatin (Lipitor) 10 mg tablet; Take 1 tablet (10 mg) by mouth once daily at bedtime.  Health care " maintenance  -     ECG 12 Lead  -     Pneumococcal conjugate vaccine, 20-valent (PREVNAR 20)  -     Flu vaccine, trivalent, preservative free, HIGH-DOSE, age 65y+ (Fluzone)  Osteopenia, unspecified location

## 2024-11-11 ENCOUNTER — HOSPITAL ENCOUNTER (OUTPATIENT)
Dept: RADIOLOGY | Facility: CLINIC | Age: 79
Discharge: HOME | End: 2024-11-11
Payer: COMMERCIAL

## 2024-11-11 VITALS — BODY MASS INDEX: 25.15 KG/M2 | WEIGHT: 136.69 LBS | HEIGHT: 62 IN

## 2024-11-11 PROCEDURE — 77067 SCR MAMMO BI INCL CAD: CPT

## 2024-11-11 PROCEDURE — 77067 SCR MAMMO BI INCL CAD: CPT | Performed by: STUDENT IN AN ORGANIZED HEALTH CARE EDUCATION/TRAINING PROGRAM

## 2024-11-11 PROCEDURE — 77063 BREAST TOMOSYNTHESIS BI: CPT | Performed by: STUDENT IN AN ORGANIZED HEALTH CARE EDUCATION/TRAINING PROGRAM

## 2024-11-14 ENCOUNTER — LAB (OUTPATIENT)
Dept: LAB | Facility: LAB | Age: 79
End: 2024-11-14
Payer: COMMERCIAL

## 2024-11-14 ENCOUNTER — APPOINTMENT (OUTPATIENT)
Dept: OBSTETRICS AND GYNECOLOGY | Facility: CLINIC | Age: 79
End: 2024-11-14
Payer: COMMERCIAL

## 2024-11-14 ENCOUNTER — APPOINTMENT (OUTPATIENT)
Dept: ENDOCRINOLOGY | Facility: CLINIC | Age: 79
End: 2024-11-14
Payer: COMMERCIAL

## 2024-11-14 VITALS
SYSTOLIC BLOOD PRESSURE: 122 MMHG | HEIGHT: 62 IN | DIASTOLIC BLOOD PRESSURE: 70 MMHG | BODY MASS INDEX: 25.54 KG/M2 | WEIGHT: 138.8 LBS

## 2024-11-14 VITALS — WEIGHT: 138 LBS | BODY MASS INDEX: 25.23 KG/M2

## 2024-11-14 DIAGNOSIS — Z12.31 BREAST CANCER SCREENING BY MAMMOGRAM: Primary | ICD-10-CM

## 2024-11-14 DIAGNOSIS — E89.0 POSTABLATIVE HYPOTHYROIDISM: ICD-10-CM

## 2024-11-14 DIAGNOSIS — E07.9 THYROID EYE DISEASE: ICD-10-CM

## 2024-11-14 DIAGNOSIS — E05.00 GRAVES DISEASE: ICD-10-CM

## 2024-11-14 DIAGNOSIS — E89.0 POSTABLATIVE HYPOTHYROIDISM: Primary | ICD-10-CM

## 2024-11-14 DIAGNOSIS — H57.89 THYROID EYE DISEASE: ICD-10-CM

## 2024-11-14 DIAGNOSIS — E21.0 PRIMARY HYPERPARATHYROIDISM (MULTI): ICD-10-CM

## 2024-11-14 LAB — TSH SERPL-ACNC: 2.24 MIU/L (ref 0.44–3.98)

## 2024-11-14 PROCEDURE — 84443 ASSAY THYROID STIM HORMONE: CPT

## 2024-11-14 PROCEDURE — 1036F TOBACCO NON-USER: CPT | Performed by: OBSTETRICS & GYNECOLOGY

## 2024-11-14 PROCEDURE — 99212 OFFICE O/P EST SF 10 MIN: CPT | Performed by: OBSTETRICS & GYNECOLOGY

## 2024-11-14 PROCEDURE — 99214 OFFICE O/P EST MOD 30 MIN: CPT | Performed by: INTERNAL MEDICINE

## 2024-11-14 PROCEDURE — 1157F ADVNC CARE PLAN IN RCRD: CPT | Performed by: OBSTETRICS & GYNECOLOGY

## 2024-11-14 PROCEDURE — 1123F ACP DISCUSS/DSCN MKR DOCD: CPT | Performed by: OBSTETRICS & GYNECOLOGY

## 2024-11-14 PROCEDURE — 1159F MED LIST DOCD IN RCRD: CPT | Performed by: OBSTETRICS & GYNECOLOGY

## 2024-11-14 PROCEDURE — 36415 COLL VENOUS BLD VENIPUNCTURE: CPT

## 2024-11-14 PROCEDURE — 1157F ADVNC CARE PLAN IN RCRD: CPT | Performed by: INTERNAL MEDICINE

## 2024-11-14 PROCEDURE — 1123F ACP DISCUSS/DSCN MKR DOCD: CPT | Performed by: INTERNAL MEDICINE

## 2024-11-14 PROCEDURE — 1126F AMNT PAIN NOTED NONE PRSNT: CPT | Performed by: OBSTETRICS & GYNECOLOGY

## 2024-11-14 RX ORDER — PSYLLIUM SEED (WITH DEXTROSE)
1 POWDER (GRAM) ORAL
COMMUNITY

## 2024-11-14 ASSESSMENT — ENCOUNTER SYMPTOMS
ALLERGIC/IMMUNOLOGIC NEGATIVE: 0
GASTROINTESTINAL NEGATIVE: 0
MUSCULOSKELETAL NEGATIVE: 0
NEUROLOGICAL NEGATIVE: 0
HEMATOLOGIC/LYMPHATIC NEGATIVE: 0
PSYCHIATRIC NEGATIVE: 0
EYES NEGATIVE: 0
RESPIRATORY NEGATIVE: 0
CONSTITUTIONAL NEGATIVE: 0
CARDIOVASCULAR NEGATIVE: 0
ENDOCRINE NEGATIVE: 0

## 2024-11-14 ASSESSMENT — PAIN SCALES - GENERAL: PAINLEVEL_OUTOF10: 0-NO PAIN

## 2024-11-14 NOTE — PROGRESS NOTES
Patient ID: Caroline Bond is a 79 y.o. female who presents for Follow-up.  HPI  he patient comes in for follow up.    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    Her eyes have remained stable although she does have some double vision looking to the left.    She continues on Synthroid 100 mcg/day.    She continues to have no symptoms of hyper or hypothyroidism or hypercalcemia.    She had a bone density that did show osteopenia in April.    She has been on Fosamax from 2009 until 2017 but discontinued it because of GERD.    Physically she has no complaints.    ROS  Comprehensive review of systems is negative.    Objective   Physical Exam  There were no vitals taken for this visit.   Vitals:    11/14/24 1032   Weight: 62.6 kg (138 lb)      Body mass index is 25.23 kg/m².      Weight 138 down 3 pounds    Eyes mild proptosis right greater than left  ENT normal. No adenopathy  Thyroid impalpable. No nodules  Chest clear to auscultation  Heart sounds are normal  Abdomen nontender. Bowel sounds normal. No organomegaly  Feet are okay    Current Outpatient Medications   Medication Sig Dispense Refill    aspirin 81 mg EC tablet Take 1 tablet (81 mg) by mouth once daily.      atorvastatin (Lipitor) 10 mg tablet Take 1 tablet (10 mg) by mouth once daily at bedtime. 100 tablet 1    cholecalciferol (Vitamin D-3) 125 MCG (5000 UT) capsule Take by mouth. TAKE AS DIRECTED.      fluticasone (Flonase) 50 mcg/actuation nasal spray Administer 2 sprays into each nostril once daily.      levothyroxine (Synthroid, Levoxyl) 100 mcg tablet TAKE 1 TABLET BY MOUTH DAILY 100 tablet 2    multivit-min/iron/folic/lutein (CENTRUM SILVER WOMEN ORAL) Take by mouth.      vit C,Y-Xg-pjpba-lutein-zeaxan (PreserVision AREDS-2) 250-90-40-1 mg capsule Take 1 capsule  by mouth every other day.       No current facility-administered medications for this visit.       Assessment/Plan     1.  Graves' disease post ablation on replacement  2.  Ophthalmopathy  3.  Primary hyperparathyroidism  4.  Impaired fasting glucose  5.  Hyperlipidemia  6.  Osteopenia    Will check TSH and make adjustments if warranted.    We discussed the bones.    There is no additional benefit to bisphosphonate since she has been on it for almost 10 years.    She is hesitant to consider injectable options however given her levels at this point we will just follow.    She will follow-up with me in 1 year or sooner as needed.

## 2024-11-14 NOTE — PROGRESS NOTES
"Caroline Bond is a 79 y.o. here for breast exam    HPI: Pt is here her every 6 month breast exam.      Objective   /70 (BP Location: Right arm)   Ht 1.575 m (5' 2\")   Wt 63 kg (138 lb 12.8 oz)   LMP  (LMP Unknown)   BMI 25.39 kg/m²      General:   Alert and oriented, in no acute distress   Neck:    Breast/Axilla: Normal to palpation bilaterally without masses, skin changes, or nipple discharge.    Abdomen:    Vulva:    Vagina:    Cervix:    Uterus:    Adnexa:    Pelvic Floor    Psych Normal affect. Normal mood.      Assessment and Plan:  Breast exam normal  Problem List Items Addressed This Visit    None       No orders of the defined types were placed in this encounter.      "

## 2024-12-10 ENCOUNTER — APPOINTMENT (OUTPATIENT)
Dept: PRIMARY CARE | Facility: CLINIC | Age: 79
End: 2024-12-10
Payer: COMMERCIAL

## 2024-12-10 VITALS
BODY MASS INDEX: 25.21 KG/M2 | OXYGEN SATURATION: 95 % | HEIGHT: 62 IN | WEIGHT: 137 LBS | SYSTOLIC BLOOD PRESSURE: 116 MMHG | HEART RATE: 78 BPM | DIASTOLIC BLOOD PRESSURE: 69 MMHG

## 2024-12-10 DIAGNOSIS — R03.0 ELEVATED BP WITHOUT DIAGNOSIS OF HYPERTENSION: ICD-10-CM

## 2024-12-10 DIAGNOSIS — E78.5 HYPERLIPIDEMIA, UNSPECIFIED HYPERLIPIDEMIA TYPE: Primary | ICD-10-CM

## 2024-12-10 PROCEDURE — 1036F TOBACCO NON-USER: CPT | Performed by: STUDENT IN AN ORGANIZED HEALTH CARE EDUCATION/TRAINING PROGRAM

## 2024-12-10 PROCEDURE — 1157F ADVNC CARE PLAN IN RCRD: CPT | Performed by: STUDENT IN AN ORGANIZED HEALTH CARE EDUCATION/TRAINING PROGRAM

## 2024-12-10 PROCEDURE — 1159F MED LIST DOCD IN RCRD: CPT | Performed by: STUDENT IN AN ORGANIZED HEALTH CARE EDUCATION/TRAINING PROGRAM

## 2024-12-10 PROCEDURE — 1123F ACP DISCUSS/DSCN MKR DOCD: CPT | Performed by: STUDENT IN AN ORGANIZED HEALTH CARE EDUCATION/TRAINING PROGRAM

## 2024-12-10 PROCEDURE — 99213 OFFICE O/P EST LOW 20 MIN: CPT | Performed by: STUDENT IN AN ORGANIZED HEALTH CARE EDUCATION/TRAINING PROGRAM

## 2024-12-10 ASSESSMENT — ENCOUNTER SYMPTOMS
NAUSEA: 0
CONSTIPATION: 0
ABDOMINAL PAIN: 0
DIZZINESS: 0
COUGH: 0
HEMATURIA: 0
FEVER: 0
WEAKNESS: 0
VOMITING: 0
DYSURIA: 0
NUMBNESS: 0
SPEECH DIFFICULTY: 0
ACTIVITY CHANGE: 0
SHORTNESS OF BREATH: 0
WHEEZING: 0

## 2024-12-10 ASSESSMENT — COLUMBIA-SUICIDE SEVERITY RATING SCALE - C-SSRS
6. HAVE YOU EVER DONE ANYTHING, STARTED TO DO ANYTHING, OR PREPARED TO DO ANYTHING TO END YOUR LIFE?: NO
2. HAVE YOU ACTUALLY HAD ANY THOUGHTS OF KILLING YOURSELF?: NO
1. IN THE PAST MONTH, HAVE YOU WISHED YOU WERE DEAD OR WISHED YOU COULD GO TO SLEEP AND NOT WAKE UP?: NO

## 2024-12-10 NOTE — PROGRESS NOTES
Subjective   Patient ID: Caroline Bond is a 79 y.o. female who presents for Follow-up (1 Month FUV BP/Home device Rt arm - 123/72).  HPI  Patient is here for a follow up  Home bp readings reassuring as well     Bp compared to office bp today  Reports endocrine did not recommend medication for bone density   Adv to continue vit D and abrahan supplement  Past Medical History:   Diagnosis Date    Atypical ductal hyperplasia, breast     Benign neoplasm of unspecified breast 08/31/2017    Breast fibroadenoma    Encounter for screening mammogram for malignant neoplasm of breast     Visit for screening mammogram    Hyperlipidemia, unspecified 07/11/2014    Hyperlipidemia    Hypothyroidism, unspecified 07/11/2014    Hypothyroidism    Other chronic allergic conjunctivitis 01/06/2016    Chronic allergic conjunctivitis    Other conditions influencing health status     DEXA Body Composition Study    Other disorders of optic nerve, not elsewhere classified, right eye 06/30/2014    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    Personal history of malignant neoplasm, unspecified     History of malignant neoplasm    Personal history of other diseases of the digestive system 12/05/2014    History of oral aphthous ulcers    Personal history of other diseases of the musculoskeletal system and connective tissue     History of osteopenia    Personal history of other endocrine, nutritional and metabolic disease     History of hyperparathyroidism    Personal history of other specified conditions     History of edema    Primary hyperparathyroidism (Multi) 07/11/2014    Primary hyperparathyroidism    Thyrotoxicosis with diffuse goiter without thyrotoxic crisis or storm     Graves disease    Unspecified benign mammary dysplasia of unspecified breast 12/19/2019    Atypical ductal hyperplasia of breast    Unspecified benign mammary dysplasia of unspecified breast     Atypical lobular hyperplasia of breast    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     "Exophthalmos      Past Surgical History:   Procedure Laterality Date    BREAST BIOPSY Right 11/25/2009    Biopsy Breast Open    CHOLECYSTECTOMY  09/16/2013    Cholecystectomy Laparoscopic    COLONOSCOPY  06/26/2013    Complete Colonoscopy    HYSTERECTOMY  09/16/2013    Hysterectomy    OTHER SURGICAL HISTORY  09/29/2022    Sinus surgery    OTHER SURGICAL HISTORY  03/30/2018    Staging Laparotomy For Hodgkin's Disease Or Lymphoma    OTHER SURGICAL HISTORY  10/08/2019    Appendectomy    OTHER SURGICAL HISTORY  10/08/2019    Cataract surgery      Family History   Problem Relation Name Age of Onset    Heart failure Mother      Dementia Mother      Hypertension Mother      Macular degeneration Mother      Dementia Father      Parkinsonism Father      Basal cell carcinoma Sister      Cancer Sister      Thyroid disease Sister      Hyperlipidemia Sister      Breast cancer Paternal Cousin      Breast cancer Paternal Cousin        Allergies   Allergen Reactions    Codeine Unknown    Ephedrine Unknown     heart palpitations  throat swelling    Moxifloxacin Unknown     neuropathy    Penicillins Unknown          Occupation:     Review of Systems   Constitutional:  Negative for activity change and fever.   HENT:  Negative for congestion.    Respiratory:  Negative for cough, shortness of breath and wheezing.    Cardiovascular:  Negative for chest pain and leg swelling.   Gastrointestinal:  Negative for abdominal pain, constipation, nausea and vomiting.   Endocrine: Negative for cold intolerance.   Genitourinary:  Negative for dysuria, hematuria and urgency.   Neurological:  Negative for dizziness, speech difficulty, weakness and numbness.   Psychiatric/Behavioral:  Negative for self-injury and suicidal ideas.        Objective   Visit Vitals  /69 (BP Location: Right arm, Patient Position: Sitting, BP Cuff Size: Adult)   Pulse 78   Ht 1.575 m (5' 2\")   Wt 62.1 kg (137 lb)   LMP  (LMP Unknown)   SpO2 95%   BMI 25.06 kg/m²   OB " Status Postmenopausal   Smoking Status Never   BSA 1.65 m²      Physical Exam  Constitutional:       Appearance: Normal appearance.   HENT:      Head: Normocephalic and atraumatic.      Nose: Nose normal.      Mouth/Throat:      Mouth: Mucous membranes are moist.   Eyes:      Conjunctiva/sclera: Conjunctivae normal.      Pupils: Pupils are equal, round, and reactive to light.   Cardiovascular:      Rate and Rhythm: Normal rate and regular rhythm.      Pulses: Normal pulses.      Heart sounds: Normal heart sounds.   Pulmonary:      Effort: Pulmonary effort is normal.      Breath sounds: Normal breath sounds.   Musculoskeletal:         General: Normal range of motion.      Cervical back: Neck supple.   Skin:     General: Skin is warm.   Neurological:      General: No focal deficit present.      Mental Status: She is alert and oriented to person, place, and time.   Psychiatric:         Mood and Affect: Mood normal.         Behavior: Behavior normal.         Thought Content: Thought content normal.         Judgment: Judgment normal.         Assessment/Plan   Diagnoses and all orders for this visit:  Hyperlipidemia, unspecified hyperlipidemia type  Elevated BP without diagnosis of hypertension

## 2025-02-21 ASSESSMENT — ENCOUNTER SYMPTOMS
FEVER: 0
ACTIVITY CHANGE: 0
SPEECH DIFFICULTY: 0
WHEEZING: 0
DYSURIA: 0
ABDOMINAL PAIN: 0
NAUSEA: 0
DIZZINESS: 0
NUMBNESS: 0
WEAKNESS: 0
VOMITING: 0
HEMATURIA: 0
CONSTIPATION: 0
COUGH: 0
SHORTNESS OF BREATH: 0

## 2025-03-28 ENCOUNTER — OFFICE VISIT (OUTPATIENT)
Dept: OTOLARYNGOLOGY | Facility: CLINIC | Age: 80
End: 2025-03-28
Payer: COMMERCIAL

## 2025-03-28 DIAGNOSIS — H61.23 BILATERAL IMPACTED CERUMEN: Primary | ICD-10-CM

## 2025-03-28 DIAGNOSIS — H90.0 CONDUCTIVE HEARING LOSS, BILATERAL: ICD-10-CM

## 2025-04-13 DIAGNOSIS — E78.5 HYPERLIPIDEMIA, UNSPECIFIED: ICD-10-CM

## 2025-04-14 RX ORDER — ATORVASTATIN CALCIUM 10 MG/1
10 TABLET, FILM COATED ORAL NIGHTLY
Qty: 100 TABLET | Refills: 2 | Status: SHIPPED | OUTPATIENT
Start: 2025-04-14

## 2025-05-25 DIAGNOSIS — E05.00 GRAVES DISEASE: ICD-10-CM

## 2025-05-26 RX ORDER — LEVOTHYROXINE SODIUM 100 UG/1
100 TABLET ORAL DAILY
Qty: 100 TABLET | Refills: 2 | Status: SHIPPED | OUTPATIENT
Start: 2025-05-26

## 2025-06-05 ENCOUNTER — APPOINTMENT (OUTPATIENT)
Dept: PRIMARY CARE | Facility: CLINIC | Age: 80
End: 2025-06-05
Payer: COMMERCIAL

## 2025-06-17 ENCOUNTER — APPOINTMENT (OUTPATIENT)
Dept: AUDIOLOGY | Facility: CLINIC | Age: 80
End: 2025-06-17
Payer: COMMERCIAL

## 2025-06-17 ENCOUNTER — APPOINTMENT (OUTPATIENT)
Dept: OTOLARYNGOLOGY | Facility: CLINIC | Age: 80
End: 2025-06-17
Payer: COMMERCIAL

## 2025-09-05 ENCOUNTER — APPOINTMENT (OUTPATIENT)
Dept: PRIMARY CARE | Facility: CLINIC | Age: 80
End: 2025-09-05
Payer: COMMERCIAL

## 2025-09-09 ENCOUNTER — APPOINTMENT (OUTPATIENT)
Dept: AUDIOLOGY | Facility: CLINIC | Age: 80
End: 2025-09-09
Payer: COMMERCIAL

## 2025-09-09 ENCOUNTER — APPOINTMENT (OUTPATIENT)
Dept: OTOLARYNGOLOGY | Facility: CLINIC | Age: 80
End: 2025-09-09
Payer: COMMERCIAL

## 2025-11-13 ENCOUNTER — APPOINTMENT (OUTPATIENT)
Dept: ENDOCRINOLOGY | Facility: CLINIC | Age: 80
End: 2025-11-13
Payer: COMMERCIAL

## 2025-12-04 ENCOUNTER — APPOINTMENT (OUTPATIENT)
Dept: OBSTETRICS AND GYNECOLOGY | Facility: CLINIC | Age: 80
End: 2025-12-04
Payer: COMMERCIAL

## 2025-12-08 ENCOUNTER — APPOINTMENT (OUTPATIENT)
Dept: ENDOCRINOLOGY | Facility: CLINIC | Age: 80
End: 2025-12-08
Payer: COMMERCIAL

## 2026-02-27 ENCOUNTER — APPOINTMENT (OUTPATIENT)
Dept: PRIMARY CARE | Facility: CLINIC | Age: 81
End: 2026-02-27
Payer: COMMERCIAL